# Patient Record
Sex: MALE | Race: WHITE | Employment: STUDENT | URBAN - NONMETROPOLITAN AREA
[De-identification: names, ages, dates, MRNs, and addresses within clinical notes are randomized per-mention and may not be internally consistent; named-entity substitution may affect disease eponyms.]

---
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
## 2025-01-13 RX ORDER — VALACYCLOVIR HYDROCHLORIDE 1 G/1
2000 TABLET, FILM COATED ORAL 2 TIMES DAILY
Qty: 16 TABLET | Refills: 3 | Status: SHIPPED | OUTPATIENT
Start: 2025-01-13

## 2025-01-13 NOTE — TELEPHONE ENCOUNTER
Marv Mcgrath is requesting a refill on the following medication(s):  Requested Prescriptions     Pending Prescriptions Disp Refills    valACYclovir (VALTREX) 1 g tablet 16 tablet 3     Sig: Take 2 tablets by mouth 2 times daily Prn cold sores       Last Visit Date (If Applicable):  12/27/2024    Next Visit Date:    Visit date not found      Patient would like to  tonight. He is having an outbreak now.

## 2025-06-13 ENCOUNTER — OFFICE VISIT (OUTPATIENT)
Dept: FAMILY MEDICINE CLINIC | Age: 20
End: 2025-06-13

## 2025-06-13 VITALS
WEIGHT: 243 LBS | OXYGEN SATURATION: 98 % | BODY MASS INDEX: 34.79 KG/M2 | HEIGHT: 70 IN | TEMPERATURE: 97 F | DIASTOLIC BLOOD PRESSURE: 90 MMHG | HEART RATE: 91 BPM | SYSTOLIC BLOOD PRESSURE: 130 MMHG

## 2025-06-13 DIAGNOSIS — J30.2 SEASONAL ALLERGIC RHINITIS, UNSPECIFIED TRIGGER: Primary | ICD-10-CM

## 2025-06-13 RX ORDER — FLUTICASONE PROPIONATE 50 MCG
1 SPRAY, SUSPENSION (ML) NASAL DAILY
Qty: 16 G | Refills: 0 | Status: SHIPPED | OUTPATIENT
Start: 2025-06-13

## 2025-06-13 RX ORDER — DEXAMETHASONE SODIUM PHOSPHATE 4 MG/ML
8 INJECTION, SOLUTION INTRA-ARTICULAR; INTRALESIONAL; INTRAMUSCULAR; INTRAVENOUS; SOFT TISSUE ONCE
Status: COMPLETED | OUTPATIENT
Start: 2025-06-13 | End: 2025-06-13

## 2025-06-13 RX ADMIN — DEXAMETHASONE SODIUM PHOSPHATE 8 MG: 4 INJECTION, SOLUTION INTRA-ARTICULAR; INTRALESIONAL; INTRAMUSCULAR; INTRAVENOUS; SOFT TISSUE at 09:41

## 2025-06-13 NOTE — PROGRESS NOTES
HPI:  Patient comes in today for   Chief Complaint   Patient presents with    Congestion     Pt is here for congestion,ear pain,cough,sneezing. Pt states it started 1 month ago.     Ear Pain   Here with c/o seasonal allergies has nasal stuffiness,with sneezing and some cough no shortness of breath.Has pressure in left ear no drainage.     REVIEW OF SYSTEMS:  Cardio: No chest pain, palpitations, CARBONE, edema, PND  Pulmonary: No cough, hemoptysis, SOB  GI: No nausea, vomiting, dysphagia, odynophagia, diarrhea,constipation  : No dysuria, hematuria, urgency, incontinence  Past Medical History:   Diagnosis Date    Closed fracture of left fibula 2019    Closed fracture of left tibia 2019    Current moderate episode of major depressive disorder without prior episode ([facility]) 05/25/2021    Generalized anxiety disorder 05/25/2021    Headache     History of fracture of radius 03/2011    left distal radius and ulna buckle fractures, followed with Dr. Robertson    Rib pain 11/12/2013       Current Outpatient Medications   Medication Sig Dispense Refill    valACYclovir (VALTREX) 1 g tablet Take 2 tablets by mouth 2 times daily Prn cold sores (Patient not taking: Reported on 6/13/2025) 16 tablet 3     No current facility-administered medications for this visit.     Allergies   Allergen Reactions    Environmental/Seasonal Other (See Comments)     Sneezing, stuffy nose, coughing       PHYSICAL EXAM:  VS:  BP (!) 130/90 (BP Site: Right Upper Arm, Patient Position: Sitting, BP Cuff Size: Large Adult)   Pulse 91   Temp 97 °F (36.1 °C)   Ht 1.778 m (5' 10\")   Wt 110.2 kg (243 lb)   SpO2 98%   BMI 34.87 kg/m²   General:  Alert and oriented, NAD  HEENT:  TMs look ok, PATRICIA, EOMI, Conjunctivae clear,Throat currently clear.Nose congested.  NECK:  Supple without adenopathy or thyromegaly, no carotid bruits  LUNGS:  CTA all fields  HEART:  RRR without Murmur  ABDOMEN:  Soft and nontender without palpable abnormalities  EXTREMITIES:  No edema,

## 2025-06-18 RX ORDER — VALACYCLOVIR HYDROCHLORIDE 1 G/1
2000 TABLET, FILM COATED ORAL 2 TIMES DAILY
Qty: 16 TABLET | Refills: 3 | Status: SHIPPED | OUTPATIENT
Start: 2025-06-18

## 2025-06-18 NOTE — TELEPHONE ENCOUNTER
Marv Mcgrath is requesting a refill on the following medication(s):  Requested Prescriptions     Pending Prescriptions Disp Refills    valACYclovir (VALTREX) 1 g tablet 16 tablet 3     Sig: Take 2 tablets by mouth 2 times daily Prn cold sores       Last Visit Date (If Applicable):  11/22/2024      Next Visit Date:    Visit date not found